# Patient Record
Sex: FEMALE | Race: WHITE | NOT HISPANIC OR LATINO | Employment: FULL TIME | URBAN - METROPOLITAN AREA
[De-identification: names, ages, dates, MRNs, and addresses within clinical notes are randomized per-mention and may not be internally consistent; named-entity substitution may affect disease eponyms.]

---

## 2023-03-22 LAB
ERYTHROCYTE DISTRIBUTION WIDTH (RATIO) BY AUTOMATED COUNT: 13.2 % (ref 11.5–14.5)
ERYTHROCYTE MEAN CORPUSCULAR HEMOGLOBIN CONCENTRATION (G/DL) BY AUTOMATED: 32.1 G/DL (ref 32–36)
ERYTHROCYTE MEAN CORPUSCULAR VOLUME (FL) BY AUTOMATED COUNT: 96 FL (ref 80–100)
ERYTHROCYTES (10*6/UL) IN BLOOD BY AUTOMATED COUNT: 3.78 X10E12/L (ref 4–5.2)
GLUCOSE, 1 HR SCREEN, PREG: 116 MG/DL
HEMATOCRIT (%) IN BLOOD BY AUTOMATED COUNT: 36.1 % (ref 36–46)
HEMOGLOBIN (G/DL) IN BLOOD: 11.6 G/DL (ref 12–16)
LEUKOCYTES (10*3/UL) IN BLOOD BY AUTOMATED COUNT: 12.8 X10E9/L (ref 4.4–11.3)
PLATELETS (10*3/UL) IN BLOOD AUTOMATED COUNT: 343 X10E9/L (ref 150–450)
REFLEX ADDED, ANEMIA PANEL: ABNORMAL

## 2023-05-11 LAB
CLUE CELLS: NORMAL
NUGENT SCORE: 1
URINE CULTURE: NORMAL
YEAST: NORMAL

## 2023-06-09 LAB — GROUP B STREP SCREEN: ABNORMAL

## 2023-07-16 LAB — URINE CULTURE: NO GROWTH

## 2023-09-20 LAB
ALANINE AMINOTRANSFERASE (SGPT) (U/L) IN SER/PLAS: 44 U/L (ref 7–45)
ALBUMIN (G/DL) IN SER/PLAS: 5 G/DL (ref 3.4–5)
ALKALINE PHOSPHATASE (U/L) IN SER/PLAS: 104 U/L (ref 33–110)
ANION GAP IN SER/PLAS: 16 MMOL/L (ref 10–20)
ASPARTATE AMINOTRANSFERASE (SGOT) (U/L) IN SER/PLAS: 28 U/L (ref 9–39)
BILIRUBIN TOTAL (MG/DL) IN SER/PLAS: 1 MG/DL (ref 0–1.2)
C REACTIVE PROTEIN (MG/L) IN SER/PLAS: 0.16 MG/DL
CALCIUM (MG/DL) IN SER/PLAS: 10.3 MG/DL (ref 8.6–10.3)
CARBON DIOXIDE, TOTAL (MMOL/L) IN SER/PLAS: 28 MMOL/L (ref 21–32)
CHLORIDE (MMOL/L) IN SER/PLAS: 101 MMOL/L (ref 98–107)
COBALAMIN (VITAMIN B12) (PG/ML) IN SER/PLAS: 512 PG/ML (ref 211–911)
CREATININE (MG/DL) IN SER/PLAS: 0.73 MG/DL (ref 0.5–1.05)
ERYTHROCYTE DISTRIBUTION WIDTH (RATIO) BY AUTOMATED COUNT: 12 % (ref 11.5–14.5)
ERYTHROCYTE MEAN CORPUSCULAR HEMOGLOBIN CONCENTRATION (G/DL) BY AUTOMATED: 33.2 G/DL (ref 32–36)
ERYTHROCYTE MEAN CORPUSCULAR VOLUME (FL) BY AUTOMATED COUNT: 89 FL (ref 80–100)
ERYTHROCYTES (10*6/UL) IN BLOOD BY AUTOMATED COUNT: 4.42 X10E12/L (ref 4–5.2)
FOLATE (NG/ML) IN SER/PLAS: >22.3 NG/ML
GFR FEMALE: >90 ML/MIN/1.73M2
GLUCOSE (MG/DL) IN SER/PLAS: 73 MG/DL (ref 74–99)
HEMATOCRIT (%) IN BLOOD BY AUTOMATED COUNT: 39.5 % (ref 36–46)
HEMOGLOBIN (G/DL) IN BLOOD: 13.1 G/DL (ref 12–16)
LEUKOCYTES (10*3/UL) IN BLOOD BY AUTOMATED COUNT: 5.7 X10E9/L (ref 4.4–11.3)
PLATELETS (10*3/UL) IN BLOOD AUTOMATED COUNT: 430 X10E9/L (ref 150–450)
POTASSIUM (MMOL/L) IN SER/PLAS: 3.8 MMOL/L (ref 3.5–5.3)
PROTEIN TOTAL: 8.1 G/DL (ref 6.4–8.2)
SEDIMENTATION RATE, ERYTHROCYTE: 12 MM/H (ref 0–20)
SODIUM (MMOL/L) IN SER/PLAS: 141 MMOL/L (ref 136–145)
THYROTROPIN (MIU/L) IN SER/PLAS BY DETECTION LIMIT <= 0.05 MIU/L: 0.99 MIU/L (ref 0.44–3.98)
TISSUE TRANSGLUTAMINASE, IGA: <1 U/ML (ref 0–14)
UREA NITROGEN (MG/DL) IN SER/PLAS: 12 MG/DL (ref 6–23)

## 2023-10-25 ENCOUNTER — ANESTHESIA EVENT (OUTPATIENT)
Dept: GASTROENTEROLOGY | Facility: EXTERNAL LOCATION | Age: 28
End: 2023-10-25

## 2023-11-01 PROBLEM — R19.4 CHANGE IN BOWEL HABITS: Status: ACTIVE | Noted: 2023-11-01

## 2023-11-01 PROBLEM — K92.1 BLOOD IN STOOL: Status: ACTIVE | Noted: 2023-11-01

## 2023-11-01 PROBLEM — O13.9 GESTATIONAL HYPERTENSION (HHS-HCC): Status: ACTIVE | Noted: 2023-11-01

## 2023-11-01 PROBLEM — H10.211 CHEMICAL CONJUNCTIVITIS OF RIGHT EYE: Status: ACTIVE | Noted: 2023-11-01

## 2023-11-01 PROBLEM — N36.8 IRRITATION OF URETHRA: Status: ACTIVE | Noted: 2023-11-01

## 2023-11-01 PROBLEM — R16.0 LIVER MASS, RIGHT LOBE: Status: ACTIVE | Noted: 2023-11-01

## 2023-11-01 PROBLEM — K76.0 FATTY LIVER: Status: ACTIVE | Noted: 2023-11-01

## 2023-11-01 RX ORDER — ACETAMINOPHEN 325 MG/1
325 TABLET ORAL EVERY 6 HOURS
COMMUNITY
Start: 2023-07-04

## 2023-11-01 RX ORDER — DOCUSATE SODIUM 100 MG/1
CAPSULE, LIQUID FILLED ORAL
COMMUNITY
End: 2023-11-02 | Stop reason: ALTCHOICE

## 2023-11-01 RX ORDER — NORGESTIMATE AND ETHINYL ESTRADIOL 7DAYSX3 LO
1 KIT ORAL DAILY
COMMUNITY
Start: 2020-07-15

## 2023-11-01 RX ORDER — IBUPROFEN 600 MG/1
600 TABLET ORAL EVERY 6 HOURS
COMMUNITY
Start: 2023-07-04

## 2023-11-02 ENCOUNTER — HOSPITAL ENCOUNTER (OUTPATIENT)
Dept: GASTROENTEROLOGY | Facility: EXTERNAL LOCATION | Age: 28
Discharge: HOME | End: 2023-11-02
Payer: COMMERCIAL

## 2023-11-02 ENCOUNTER — ANESTHESIA (OUTPATIENT)
Dept: GASTROENTEROLOGY | Facility: EXTERNAL LOCATION | Age: 28
End: 2023-11-02

## 2023-11-02 VITALS
OXYGEN SATURATION: 100 % | RESPIRATION RATE: 17 BRPM | SYSTOLIC BLOOD PRESSURE: 116 MMHG | HEART RATE: 72 BPM | TEMPERATURE: 97.9 F | WEIGHT: 165 LBS | HEIGHT: 69 IN | BODY MASS INDEX: 24.44 KG/M2 | DIASTOLIC BLOOD PRESSURE: 88 MMHG

## 2023-11-02 DIAGNOSIS — R19.4 CHANGE IN BOWEL HABIT: ICD-10-CM

## 2023-11-02 DIAGNOSIS — K92.1 MELENA: Primary | ICD-10-CM

## 2023-11-02 LAB — PREGNANCY TEST URINE, POC: NEGATIVE

## 2023-11-02 PROCEDURE — 45378 DIAGNOSTIC COLONOSCOPY: CPT | Performed by: INTERNAL MEDICINE

## 2023-11-02 PROCEDURE — 81025 URINE PREGNANCY TEST: CPT | Performed by: INTERNAL MEDICINE

## 2023-11-02 RX ORDER — SODIUM CHLORIDE 9 MG/ML
20 INJECTION, SOLUTION INTRAVENOUS CONTINUOUS
Status: DISCONTINUED | OUTPATIENT
Start: 2023-11-02 | End: 2023-11-03 | Stop reason: HOSPADM

## 2023-11-02 RX ORDER — PROPOFOL 10 MG/ML
INJECTION, EMULSION INTRAVENOUS AS NEEDED
Status: DISCONTINUED | OUTPATIENT
Start: 2023-11-02 | End: 2023-11-02

## 2023-11-02 RX ADMIN — SODIUM CHLORIDE: 9 INJECTION, SOLUTION INTRAVENOUS at 08:05

## 2023-11-02 RX ADMIN — PROPOFOL 20 MG: 10 INJECTION, EMULSION INTRAVENOUS at 08:16

## 2023-11-02 RX ADMIN — PROPOFOL 20 MG: 10 INJECTION, EMULSION INTRAVENOUS at 08:13

## 2023-11-02 RX ADMIN — PROPOFOL 120 MG: 10 INJECTION, EMULSION INTRAVENOUS at 08:08

## 2023-11-02 RX ADMIN — PROPOFOL 80 MG: 10 INJECTION, EMULSION INTRAVENOUS at 08:12

## 2023-11-02 SDOH — HEALTH STABILITY: MENTAL HEALTH: CURRENT SMOKER: 0

## 2023-11-02 ASSESSMENT — COLUMBIA-SUICIDE SEVERITY RATING SCALE - C-SSRS
6. HAVE YOU EVER DONE ANYTHING, STARTED TO DO ANYTHING, OR PREPARED TO DO ANYTHING TO END YOUR LIFE?: NO
1. IN THE PAST MONTH, HAVE YOU WISHED YOU WERE DEAD OR WISHED YOU COULD GO TO SLEEP AND NOT WAKE UP?: NO
2. HAVE YOU ACTUALLY HAD ANY THOUGHTS OF KILLING YOURSELF?: NO

## 2023-11-02 ASSESSMENT — PAIN SCALES - GENERAL
PAINLEVEL_OUTOF10: 0 - NO PAIN
PAINLEVEL_OUTOF10: 0 - NO PAIN
PAIN_LEVEL: 0
PAINLEVEL_OUTOF10: 0 - NO PAIN
PAINLEVEL_OUTOF10: 0 - NO PAIN

## 2023-11-02 ASSESSMENT — PAIN - FUNCTIONAL ASSESSMENT
PAIN_FUNCTIONAL_ASSESSMENT: 0-10

## 2023-11-02 NOTE — PRE-SEDATION DOCUMENTATION
Patient: Ann Moore  MRN: [id number]    Pre-sedation Evaluation:  Sedation necessary for: Analgesia  Requesting service: gi    History of Present Illness: diagnostic colonoscopy. Rectal bleed     Past Medical History:   Diagnosis Date    Fatty (change of) liver, not elsewhere classified 04/05/2021    Fatty liver    Other conditions influencing health status     No significant past surgical history    Other conditions influencing health status     No significant past medical history       Principle problems:  Patient Active Problem List    Diagnosis Date Noted    Blood in stool 11/01/2023    Change in bowel habits 11/01/2023    Chemical conjunctivitis of right eye 11/01/2023    Fatty liver 11/01/2023    Gestational hypertension 11/01/2023    Irritation of urethra 11/01/2023    Liver mass, right lobe 11/01/2023    Normal vaginal delivery 11/01/2023    Third degree perineal laceration, delivered, current hospitalization 11/01/2023     Allergies:  No Known Allergies  PTA/Current Medications:  (Not in a hospital admission)    Current Outpatient Medications   Medication Sig Dispense Refill    acetaminophen (Tylenol) 325 mg tablet Take 1 tablet (325 mg) by mouth every 6 hours.      docusate sodium (Colace) 100 mg capsule Take by mouth.      ibuprofen 600 mg tablet Take 1 tablet (600 mg) by mouth every 6 hours.      norgestimate-ethinyl estradioL (Ortho Tri-Cyclen LO) 0.18/0.215/0.25 mg-25 mcg tablet Take 1 tablet by mouth once daily.      prenatal 21/iron fu/folic acid (PRENATAL COMPLETE ORAL) Take by mouth once daily.       No current facility-administered medications for this encounter.     Past Surgical History:   has a past surgical history that includes Other surgical history (08/17/2020).    Recent sedation/surgery (24 hours) No    Review of Systems:  Please check all that apply: No significant medical history    Pregnancy test completed prior to procedure on any menstruating female: negative        NPO  guidelines met: Yes    Physical Exam    Airway  Mallampati: I  TM distance: <3 FB  Neck ROM: full     Cardiovascular - normal exam     Dental - normal exam     Pulmonary - normal exam         Plan    ASA 1     Deep

## 2023-11-02 NOTE — ANESTHESIA POSTPROCEDURE EVALUATION
Patient: Ann Moore    Procedure Summary       Date: 11/02/23 Room / Location: [facility]    Anesthesia Start: 0805 Anesthesia Stop: 0825    Procedure: COLONOSCOPY Diagnosis:       Melena      Change in bowel habit      Melena    Scheduled Providers: Kain Leos MD Responsible Provider: SHAGGY Coffey    Anesthesia Type: MAC ASA Status: 1            Anesthesia Type: MAC    Vitals Value Taken Time   /79 11/02/23 0824   Temp 36.6 °C (97.9 °F) 11/02/23 0824   Pulse 76 11/02/23 0824   Resp 19 11/02/23 0824   SpO2 99 % 11/02/23 0824       Anesthesia Post Evaluation    Patient location during evaluation: PACU  Patient participation: complete - patient participated  Level of consciousness: awake and alert  Pain score: 0  Pain management: satisfactory to patient  Airway patency: patent  Cardiovascular status: acceptable  Respiratory status: acceptable  Hydration status: acceptable        There were no known notable events for this encounter.

## 2023-11-02 NOTE — ANESTHESIA PREPROCEDURE EVALUATION
Patient: Ann Moore    Procedure Information       Date/Time: 11/02/23 0800    Scheduled providers: Kain Leos MD    Procedure: COLONOSCOPY    Location: [facility]            Relevant Problems   Anesthesia (within normal limits)      Cardiovascular   (+) Gestational hypertension      /Renal   (+) Fatty liver      GI/Hepatic   (+) Fatty liver       Clinical information reviewed:   Tobacco  Allergies  Meds  Problems  Med Hx  Surg Hx  OB Status    Fam Hx  Soc Hx        NPO Detail:  NPO/Void Status  Date of Last Liquid: 11/01/23  Time of Last Liquid: 2330  Date of Last Solid: 11/01/23  Time of Last Solid: 0600  Last Intake Type: Clear fluids; GI prep         Physical Exam    Airway  Mallampati: II  TM distance: >3 FB  Neck ROM: full     Cardiovascular - normal exam     Dental    Pulmonary - normal exam     Abdominal - normal exam             Anesthesia Plan    ASA 1     MAC     The patient is not a current smoker.  Patient was not previously instructed to abstain from smoking on day of procedure.  Patient did not smoke on day of procedure.    intravenous induction   Anesthetic plan and risks discussed with patient.  Use of blood products discussed with patient who consented to blood products.    Plan discussed with CRNA.

## 2023-11-02 NOTE — PRE-SEDATION DOCUMENTATION
Patient: Ann Moore  MRN: [id number]    Pre-sedation Evaluation:  Sedation necessary for: Analgesia  Requesting service: gi    History of Present Illness: diagnostic colonoscopy.rectal bleed     Past Medical History:   Diagnosis Date    Fatty (change of) liver, not elsewhere classified 04/05/2021    Fatty liver    Other conditions influencing health status     No significant past surgical history    Other conditions influencing health status     No significant past medical history    Rectal bleeding        Principle problems:  Patient Active Problem List    Diagnosis Date Noted    Blood in stool 11/01/2023    Change in bowel habits 11/01/2023    Chemical conjunctivitis of right eye 11/01/2023    Fatty liver 11/01/2023    Gestational hypertension 11/01/2023    Irritation of urethra 11/01/2023    Liver mass, right lobe 11/01/2023    Normal vaginal delivery 11/01/2023    Third degree perineal laceration, delivered, current hospitalization 11/01/2023     Allergies:  No Known Allergies  PTA/Current Medications:  (Not in a hospital admission)    Current Outpatient Medications   Medication Sig Dispense Refill    prenatal 21/iron fu/folic acid (PRENATAL COMPLETE ORAL) Take by mouth once daily.      acetaminophen (Tylenol) 325 mg tablet Take 1 tablet (325 mg) by mouth every 6 hours.      ibuprofen 600 mg tablet Take 1 tablet (600 mg) by mouth every 6 hours.      norgestimate-ethinyl estradioL (Ortho Tri-Cyclen LO) 0.18/0.215/0.25 mg-25 mcg tablet Take 1 tablet by mouth once daily.       Current Facility-Administered Medications   Medication Dose Route Frequency Provider Last Rate Last Admin    sodium chloride 0.9% infusion  20 mL/hr intravenous Continuous Kain Leos MD         Past Surgical History:   has a past surgical history that includes Other surgical history (08/17/2020).    Recent sedation/surgery (24 hours) No    Review of Systems:  Please check all that apply: No significant medical history    Pregnancy  test completed prior to procedure on any menstruating female: negative        NPO guidelines met: Yes    Physical Exam    Airway  Mallampati: I  TM distance: <3 FB  Neck ROM: full     Cardiovascular - normal exam     Dental - normal exam     Pulmonary - normal exam         Plan    ASA 1     Deep

## 2023-11-02 NOTE — DISCHARGE INSTRUCTIONS
Patient Instructions Post Procedure      The anesthetics, sedatives or narcotics which were given to you today will be acting in your body for the next 24 hours, so you might feel a little sleepy or groggy.  This feeling should slowly wear off. Carefully read and follow the instructions.     You received sedation today:  - Do not drive or operate any machinery or power tools of any kind.   - No alcoholic beverages today, not even beer or wine.  - Do not make any important decisions or sign any legal documents.  - No over the counter medications that contain alcohol or that may cause drowsiness.    While it is common to experience mild to moderate abdominal distention, gas, or belching after your procedure, if any of these symptoms occur following discharge from the GI Lab or within one week of having your procedure, call the [facility] to be advised whether a visit to your nearest Urgent Care or Emergency Department is indicated.  Take this paper with you if you go.   - If you develop an allergic reaction to the medications that were given during your procedure such as difficulty breathing, rash, hives, severe nausea, vomiting or lightheadedness.  - If you experience chest pain, shortness of breath, severe abdominal pain, fevers and chills.  -If you develop signs and symptoms of bleeding such as blood in your spit, if your stools turn black, tarry, or bloody  - If you have not urinated within 8 hours following your procedure.  - If your IV site becomes painful, red, inflamed, or looks infected.    If you received a biopsy/polypectomy/sphincterotomy the following instructions apply below:  __ Do not use Aspirin containing products, non-steroidal medications or anti-coagulants for one week following your procedure. (Examples of these types of medications are: Advil, Arthrotec, Aleve, Coumadin, Ecotrin, Heparin, Ibuprofen, Indocin, Motrin, Naprosyn, Nuprin, Plavix, Vioxx, and Voltarin, or their generic  forms.  This list is not all-inclusive.  Check with your physician or pharmacist before resuming medications.)   __ Eat a soft diet today.  Avoid foods that are poorly digested for the next 24 hours.  These foods would include: nuts, beans, lettuce, red meats, and fried foods. Start with liquids and advance your diet as tolerated, gradually work up to eating solids.   __ Do not have a Barium Study or Enema for one week.    Your physician recommends the additional following instructions:    -You have a contact number available for emergencies. The signs and symptoms of potential delayed complications were discussed with you. You may return to normal activities tomorrow.  -Resume your previous diet or other if specified.  -Continue your present medications.   -We are waiting for your pathology results, if applicable.  -The findings and recommendations have been discussed with you and/or family.  - Please see Medication Reconciliation Form for new medication/medications prescribed.     If you experience any problems or have any questions following discharge from the GI Lab, please call: [phone] from 7 am- 4:30 pm.  In the event of an emergency please go to the closest Emergency Department or call Dr. Leos at [phone]

## 2023-11-20 ENCOUNTER — APPOINTMENT (OUTPATIENT)
Dept: GASTROENTEROLOGY | Facility: CLINIC | Age: 28
End: 2023-11-20

## 2025-02-03 ENCOUNTER — APPOINTMENT (OUTPATIENT)
Dept: OBSTETRICS AND GYNECOLOGY | Facility: CLINIC | Age: 30
End: 2025-02-03
Payer: COMMERCIAL

## 2025-03-05 ENCOUNTER — APPOINTMENT (OUTPATIENT)
Dept: OBSTETRICS AND GYNECOLOGY | Facility: CLINIC | Age: 30
End: 2025-03-05
Payer: COMMERCIAL

## 2025-03-05 VITALS
DIASTOLIC BLOOD PRESSURE: 112 MMHG | HEIGHT: 69 IN | WEIGHT: 182 LBS | HEART RATE: 124 BPM | SYSTOLIC BLOOD PRESSURE: 142 MMHG | OXYGEN SATURATION: 99 % | BODY MASS INDEX: 26.96 KG/M2

## 2025-03-05 DIAGNOSIS — Z01.419 WELL WOMAN EXAM WITH ROUTINE GYNECOLOGICAL EXAM: Primary | ICD-10-CM

## 2025-03-05 DIAGNOSIS — Z32.01 POSITIVE PREGNANCY TEST (HHS-HCC): ICD-10-CM

## 2025-03-05 LAB — PREGNANCY TEST URINE, POC: POSITIVE

## 2025-03-05 PROCEDURE — 3008F BODY MASS INDEX DOCD: CPT | Performed by: OBSTETRICS & GYNECOLOGY

## 2025-03-05 PROCEDURE — 3077F SYST BP >= 140 MM HG: CPT | Performed by: OBSTETRICS & GYNECOLOGY

## 2025-03-05 PROCEDURE — 3080F DIAST BP >= 90 MM HG: CPT | Performed by: OBSTETRICS & GYNECOLOGY

## 2025-03-05 PROCEDURE — 99395 PREV VISIT EST AGE 18-39: CPT | Performed by: OBSTETRICS & GYNECOLOGY

## 2025-03-05 PROCEDURE — 1036F TOBACCO NON-USER: CPT | Performed by: OBSTETRICS & GYNECOLOGY

## 2025-03-05 PROCEDURE — 81025 URINE PREGNANCY TEST: CPT | Performed by: OBSTETRICS & GYNECOLOGY

## 2025-03-05 ASSESSMENT — PATIENT HEALTH QUESTIONNAIRE - PHQ9
SUM OF ALL RESPONSES TO PHQ9 QUESTIONS 1 & 2: 0
2. FEELING DOWN, DEPRESSED OR HOPELESS: NOT AT ALL
1. LITTLE INTEREST OR PLEASURE IN DOING THINGS: NOT AT ALL

## 2025-03-05 NOTE — PROGRESS NOTES
"Patient presents for an annual exam  Last PAP 2023 ASCUS HPV-  Last Mammogram N/A   LMP 2025  Sexually active  Took +UPT this morning    LUIS Acevedo    ANNUAL SUBJECTIVE    Ann Moore is a 29 y.o. female who presents for annual exam today.  Her periods are regular, had a positive UPT today at home this AM.  Taking a prenatal vitamin. Last delivery at term c/b sPEC s/p mag.    PMH - none    PSH - none    OB history -   No obstetric history on file.    Last pap -   ASCUS HPV Negative     Last mammogram - n/a    Family history of breast or ovarian cancer - no    OBJECTIVE  BP (!) 142/112 (BP Location: Right arm, Patient Position: Sitting, BP Cuff Size: Adult)   Pulse (!) 124   Ht 1.753 m (5' 9\")   Wt 82.6 kg (182 lb)   LMP 2025   SpO2 99%   BMI 26.88 kg/m²     General Appearance   - consistent with stated age, well groomed and cooperative    Integumentary  - skin warm and dry without rash    Head and Neck  - normalocephalic and neck supple    Chest and Lung Exam  - normal breathing effort, no respiratory distress    Breast  - symmetry noted, no mass palpable, no skin change and no nipple discharge.    Abdomen  - soft, nontender and no hepatomegaly, splenomegaly, or mass    Female Genitourinary  - vulva normal without rash or lesion, normal vaginal rugae, no vaginal discharge, uterus normal size & no palpable masses, no adnexal mass, no adnexal tenderness, no cervical motion tenderness    Peripheral Vascular  - no edema present    ASSESSMENT/PLAN  29 y.o. yo  female who presents for annual exam.       Actions performed during this visit include:  - Clinical breast exam normal  - Clinical pelvic exam normal  - Pap: up to date, due next year  - Mammogram not indicated  - +UPT, will make new OB appt for 3-4 wks from now.    Ashley Walters MD      "

## 2025-03-23 ENCOUNTER — TELEPHONE (OUTPATIENT)
Dept: OBSTETRICS AND GYNECOLOGY | Facility: HOSPITAL | Age: 30
End: 2025-03-23
Payer: COMMERCIAL

## 2025-03-23 NOTE — TELEPHONE ENCOUNTER
Ann Moore is a 29 y.o. year old female  at approx 6w GA calling into the answering service with vaginal bleeding. Started with dark brown discharge yesterday, now with bleeding. Has been wearing a panty liner, not soaking through. She is having some cramping period-like pain. No other abdominal pain, nausea, vomiting, dizziness, fevers, chills.     Discussed possible etiologies of bleeding in early pregnancy including possible impending miscarriage. Reviewed bleeding precautions and when to seek emergency care. Encouraged patient to contact Dr. Walters's office tomorrow to schedule follow up. All questions answered and reinforced availability of answering service.

## 2025-03-24 ENCOUNTER — TELEPHONE (OUTPATIENT)
Dept: OBSTETRICS AND GYNECOLOGY | Facility: CLINIC | Age: 30
End: 2025-03-24
Payer: COMMERCIAL

## 2025-03-24 DIAGNOSIS — O20.9 HEMORRHAGE IN EARLY PREGNANCY: Primary | ICD-10-CM

## 2025-03-24 NOTE — TELEPHONE ENCOUNTER
I called and spoke with the patient. Patient advised to take a UPT. Patient will take one and call me with result.

## 2025-03-24 NOTE — TELEPHONE ENCOUNTER
Patient called OB line and spoke with me. Patient stated she is 5w 6d pregnant. LMP 2/11/2025. Has new OB appt with Dr. Walters on 4/09. Saturday night had brown discharge that turned into period bleeding. No clots. Mild period cramping. Patient is still bleeding and hasn't stopped since Saturday night. This is patient's second pregnancy. No history of miscarriage. Patient didn't know what to do from here.   Message sent to Dr. Walters for advise/guidance.   I will reach back out to patient after hearing from Dr. Walters.

## 2025-03-25 LAB — B-HCG SERPL-ACNC: 586 MIU/ML

## 2025-03-26 ENCOUNTER — APPOINTMENT (OUTPATIENT)
Dept: PRIMARY CARE | Facility: CLINIC | Age: 30
End: 2025-03-26
Payer: COMMERCIAL

## 2025-03-26 DIAGNOSIS — O20.9 HEMORRHAGE IN EARLY PREGNANCY: ICD-10-CM

## 2025-03-28 LAB — B-HCG SERPL-ACNC: 135 MIU/ML

## 2025-03-31 DIAGNOSIS — O20.9 HEMORRHAGE IN EARLY PREGNANCY: ICD-10-CM

## 2025-04-02 LAB — B-HCG SERPL-ACNC: 11 MIU/ML

## 2025-04-09 ENCOUNTER — APPOINTMENT (OUTPATIENT)
Dept: OBSTETRICS AND GYNECOLOGY | Facility: CLINIC | Age: 30
End: 2025-04-09
Payer: COMMERCIAL

## 2025-04-09 VITALS
HEART RATE: 97 BPM | HEIGHT: 69 IN | WEIGHT: 179 LBS | DIASTOLIC BLOOD PRESSURE: 84 MMHG | BODY MASS INDEX: 26.51 KG/M2 | SYSTOLIC BLOOD PRESSURE: 129 MMHG | OXYGEN SATURATION: 98 %

## 2025-04-09 DIAGNOSIS — O02.1 MISSED ABORTION (HHS-HCC): ICD-10-CM

## 2025-04-09 LAB — PREGNANCY TEST URINE, POC: NEGATIVE

## 2025-04-09 PROCEDURE — 81025 URINE PREGNANCY TEST: CPT | Performed by: OBSTETRICS & GYNECOLOGY

## 2025-04-09 PROCEDURE — 3074F SYST BP LT 130 MM HG: CPT | Performed by: OBSTETRICS & GYNECOLOGY

## 2025-04-09 PROCEDURE — 3079F DIAST BP 80-89 MM HG: CPT | Performed by: OBSTETRICS & GYNECOLOGY

## 2025-04-09 PROCEDURE — 3008F BODY MASS INDEX DOCD: CPT | Performed by: OBSTETRICS & GYNECOLOGY

## 2025-04-09 PROCEDURE — 99212 OFFICE O/P EST SF 10 MIN: CPT | Performed by: OBSTETRICS & GYNECOLOGY

## 2025-04-09 ASSESSMENT — ENCOUNTER SYMPTOMS
RESPIRATORY NEGATIVE: 1
EYES NEGATIVE: 1
CARDIOVASCULAR NEGATIVE: 1
ENDOCRINE NEGATIVE: 1
GASTROINTESTINAL NEGATIVE: 1
CONSTITUTIONAL NEGATIVE: 1

## 2025-04-09 NOTE — PROGRESS NOTES
Subjective   Patient ID: Ann Moore is a 29 y.o. female who presents for Follow-up.    HPI  30 y/o  presenting for follow up of completed ab.  Had a +UPT, then started bleeding on 3/23 at 5w6d, bhcg trended down to 11 and her UPT here today is negative.  No longer having any bleeding. Rh+.     Review of Systems   Constitutional: Negative.    HENT: Negative.     Eyes: Negative.    Respiratory: Negative.     Cardiovascular: Negative.    Gastrointestinal: Negative.    Endocrine: Negative.    Genitourinary: Negative.        Objective   Physical Exam  Gen in NAD    Assessment/Plan   30 y/o  presenting for follow up of completed Ab.  No further bleeding and UPT is neg. Discussed that almost all first trimester losses are chromosomal in nature.  Discussed a period usually in around 4-6 wks after she started bleeding. Okay to try again now.  If does not get a period, take another UPT.  Plan US at 7 wks in next pregnancy, offered serial hcg with next positive UPT if that will help, she will think about.  All questions answered.    Ashley Walters MD 25 11:19 AM

## 2025-06-23 ENCOUNTER — APPOINTMENT (OUTPATIENT)
Dept: OBSTETRICS AND GYNECOLOGY | Facility: CLINIC | Age: 30
End: 2025-06-23
Payer: COMMERCIAL

## 2025-06-23 VITALS — DIASTOLIC BLOOD PRESSURE: 90 MMHG | BODY MASS INDEX: 26.14 KG/M2 | WEIGHT: 177 LBS | SYSTOLIC BLOOD PRESSURE: 145 MMHG

## 2025-06-23 DIAGNOSIS — I10 CHRONIC HYPERTENSION: ICD-10-CM

## 2025-06-23 DIAGNOSIS — Z34.81 PRENATAL CARE, SUBSEQUENT PREGNANCY IN FIRST TRIMESTER: ICD-10-CM

## 2025-06-23 DIAGNOSIS — Z3A.09 9 WEEKS GESTATION OF PREGNANCY (HHS-HCC): Primary | ICD-10-CM

## 2025-06-23 PROBLEM — R19.4 CHANGE IN BOWEL HABITS: Status: RESOLVED | Noted: 2023-11-01 | Resolved: 2025-06-23

## 2025-06-23 PROBLEM — N36.8 IRRITATION OF URETHRA: Status: RESOLVED | Noted: 2023-11-01 | Resolved: 2025-06-23

## 2025-06-23 PROBLEM — R16.0 LIVER MASS, RIGHT LOBE: Status: RESOLVED | Noted: 2023-11-01 | Resolved: 2025-06-23

## 2025-06-23 PROBLEM — K92.1 BLOOD IN STOOL: Status: RESOLVED | Noted: 2023-11-01 | Resolved: 2025-06-23

## 2025-06-23 PROBLEM — K76.0 FATTY LIVER: Status: RESOLVED | Noted: 2023-11-01 | Resolved: 2025-06-23

## 2025-06-23 PROCEDURE — 0500F INITIAL PRENATAL CARE VISIT: CPT | Performed by: OBSTETRICS & GYNECOLOGY

## 2025-06-23 ASSESSMENT — EDINBURGH POSTNATAL DEPRESSION SCALE (EPDS)
I HAVE BEEN SO UNHAPPY THAT I HAVE HAD DIFFICULTY SLEEPING: NOT AT ALL
TOTAL SCORE: 4
THE THOUGHT OF HARMING MYSELF HAS OCCURRED TO ME: NEVER
THINGS HAVE BEEN GETTING ON TOP OF ME: NO, I HAVE BEEN COPING AS WELL AS EVER
I HAVE FELT SCARED OR PANICKY FOR NO GOOD REASON: NO, NOT MUCH
I HAVE BEEN ANXIOUS OR WORRIED FOR NO GOOD REASON: YES, SOMETIMES
I HAVE BEEN ABLE TO LAUGH AND SEE THE FUNNY SIDE OF THINGS: AS MUCH AS I ALWAYS COULD
I HAVE BLAMED MYSELF UNNECESSARILY WHEN THINGS WENT WRONG: NOT VERY OFTEN
I HAVE FELT SAD OR MISERABLE: NO, NOT AT ALL
I HAVE BEEN SO UNHAPPY THAT I HAVE BEEN CRYING: NO, NEVER
I HAVE LOOKED FORWARD WITH ENJOYMENT TO THINGS: AS MUCH AS I EVER DID

## 2025-06-23 NOTE — PROGRESS NOTES
Subjective   Patient ID [id number]   Ann Moore is a 30 y.o.  at 9w0d with a working estimated date of delivery of 2026, by Last Menstrual Period who presents for an initial prenatal visit.     Her pregnancy is complicated by:  -h/o  at term c/b sPEC s/p mag 2023  -early MAB earlier this year  -likely cHTN (not on meds)    OB History    Para Term  AB Living   3 1 1  1 1   SAB IAB Ectopic Multiple Live Births   1    1      # Outcome Date GA Lbr Al[place]ado/2nd Weight Sex Type Anes PTL Lv   3 Current            2 Term 23 40w2d  3.232 kg F Vag-Spont EPI  ROMELIA   1 SAB  5w6d            [facility]  Depression Scale Total: 4    Objective   Physical Exam  Weight: 80.3 kg (177 lb)  Expected Total Weight Gain: 7 kg (15 lb)-11.5 kg (25 lb)   Pregravid BMI: 26.13  BP: 145/90    OBGyn Exam  Gen in NAD  TVUS with single IUP with +FCA c/w LMP dating, JANEEN 26    Prenatal Labs  Will draw n/v    Assessment/Plan   31 y/o  at 9.0 wks by sure LMP c/w FTUS today presenting for new ob visit.  -continue prenatal vitamin  -starting BMI 26  -declines NIPS  -will schedule NT scan  -start  mg at 12 wks  -discussed her bps, likely has cHTN, discussed starting bp meds for bps >140/90, will see what bp is next visit to decide on meds or not; plan baseline HELLP labs with new ob labs  -h/o 3rd degree perineal laceration, discuss YULI at future visit  -well aware of practice model, [facility] etc  -RTC 4 wks    Ashley Walters MD

## 2025-06-25 LAB
BACTERIA UR CULT: NORMAL
C TRACH RRNA SPEC QL NAA+PROBE: NOT DETECTED
N GONORRHOEA RRNA SPEC QL NAA+PROBE: NOT DETECTED
QUEST GC CT AMPLIFIED (ALWAYS MESSAGE): NORMAL

## 2025-07-14 ENCOUNTER — TELEPHONE (OUTPATIENT)
Dept: OBSTETRICS AND GYNECOLOGY | Facility: CLINIC | Age: 30
End: 2025-07-14

## 2025-07-15 ENCOUNTER — HOSPITAL ENCOUNTER (EMERGENCY)
Facility: HOSPITAL | Age: 30
Discharge: HOME | End: 2025-07-15
Attending: STUDENT IN AN ORGANIZED HEALTH CARE EDUCATION/TRAINING PROGRAM
Payer: COMMERCIAL

## 2025-07-15 VITALS
BODY MASS INDEX: 26.22 KG/M2 | SYSTOLIC BLOOD PRESSURE: 137 MMHG | HEART RATE: 83 BPM | DIASTOLIC BLOOD PRESSURE: 83 MMHG | RESPIRATION RATE: 16 BRPM | HEIGHT: 69 IN | OXYGEN SATURATION: 100 % | TEMPERATURE: 97.5 F | WEIGHT: 177 LBS

## 2025-07-15 DIAGNOSIS — R51.9 NONINTRACTABLE HEADACHE, UNSPECIFIED CHRONICITY PATTERN, UNSPECIFIED HEADACHE TYPE: Primary | ICD-10-CM

## 2025-07-15 LAB
APPEARANCE UR: CLEAR
BILIRUB UR STRIP.AUTO-MCNC: NEGATIVE MG/DL
COLOR UR: COLORLESS
GLUCOSE UR STRIP.AUTO-MCNC: NORMAL MG/DL
KETONES UR STRIP.AUTO-MCNC: NEGATIVE MG/DL
LEUKOCYTE ESTERASE UR QL STRIP.AUTO: NEGATIVE
NITRITE UR QL STRIP.AUTO: NEGATIVE
PH UR STRIP.AUTO: 6.5 [PH]
PROT UR STRIP.AUTO-MCNC: NEGATIVE MG/DL
RBC # UR STRIP.AUTO: NEGATIVE MG/DL
SP GR UR STRIP.AUTO: 1
UROBILINOGEN UR STRIP.AUTO-MCNC: NORMAL MG/DL

## 2025-07-15 PROCEDURE — 99284 EMERGENCY DEPT VISIT MOD MDM: CPT

## 2025-07-15 PROCEDURE — 99283 EMERGENCY DEPT VISIT LOW MDM: CPT | Performed by: STUDENT IN AN ORGANIZED HEALTH CARE EDUCATION/TRAINING PROGRAM

## 2025-07-15 PROCEDURE — 81003 URINALYSIS AUTO W/O SCOPE: CPT | Performed by: STUDENT IN AN ORGANIZED HEALTH CARE EDUCATION/TRAINING PROGRAM

## 2025-07-15 PROCEDURE — 87086 URINE CULTURE/COLONY COUNT: CPT | Mod: STJLAB | Performed by: STUDENT IN AN ORGANIZED HEALTH CARE EDUCATION/TRAINING PROGRAM

## 2025-07-15 RX ORDER — ACETAMINOPHEN 325 MG/1
975 TABLET ORAL ONCE
Status: DISCONTINUED | OUTPATIENT
Start: 2025-07-15 | End: 2025-07-15 | Stop reason: HOSPADM

## 2025-07-15 ASSESSMENT — PAIN DESCRIPTION - DESCRIPTORS: DESCRIPTORS: ACHING

## 2025-07-15 ASSESSMENT — PAIN - FUNCTIONAL ASSESSMENT: PAIN_FUNCTIONAL_ASSESSMENT: 0-10

## 2025-07-15 ASSESSMENT — PAIN SCALES - GENERAL: PAINLEVEL_OUTOF10: 3

## 2025-07-15 ASSESSMENT — PAIN DESCRIPTION - LOCATION: LOCATION: HEAD

## 2025-07-15 NOTE — ED PROVIDER NOTES
History of Present Illness       History provided by: Patient  Limitations to History: None  External Records Reviewed with Brief Summary: Notes reviewed in patient's chart    HPI:  HPI     This is a 30-year-old female G3, P1 with previous miscarriage and history of preeclampsia during labor per patient presenting to the ED for headache.  Patient states for the past 5 days since Friday she has had a gradually worsening headache.  States originally it was come and go but since Sunday has increased in severity and is more constant.  Endorsing lightheadedness without syncope on Sunday.  Denies vision changes, nausea/vomiting, chest pain, shortness of breath, vaginal bleeding, abdominal pain.  Currently she states is a dull headache which is 3 in severity.  States she did message with her OB/GYN in which she was told to come to the ED for further evaluation of other causes.    Physical Exam   Physical Exam  Constitutional:       Appearance: She is well-developed.   HENT:      Head: Normocephalic.      Nose: Nose normal.      Mouth/Throat:      Mouth: Mucous membranes are moist.   Eyes:      Extraocular Movements: Extraocular movements intact.   Cardiovascular:      Rate and Rhythm: Normal rate and regular rhythm.   Pulmonary:      Effort: Pulmonary effort is normal.      Breath sounds: Normal breath sounds.   Abdominal:      Tenderness: There is no abdominal tenderness.   Musculoskeletal:         General: Normal range of motion.      Cervical back: Normal range of motion.   Skin:     General: Skin is warm.   Neurological:      General: No focal deficit present.      Mental Status: She is alert and oriented to person, place, and time.      Cranial Nerves: No dysarthria or facial asymmetry.      Sensory: Sensation is intact.      Motor: No weakness.      Coordination: Finger-Nose-Finger Test normal.   Psychiatric:         Mood and Affect: Mood normal.         Behavior: Behavior normal.         Thought Content: Thought  content normal.          Triage vitals:  T 36.4 °C (97.5 °F)  HR 98  /87  RR 17  O2 100 % None (Room air)    Medical Decision Making & ED Course     ED Course & MDM       Medical Decision Making:  Medical Decision Making  This is a 30-year-old female G3, P1 with previous miscarriage and history of preeclampsia during labor per patient presenting to the ED for headache.      Upon arrival to the ED patient is alert and oriented in no acute distress.  Blood pressure on arrival, 144/87. she is vitally stable and afebrile.  On exam she is neurally intact, face with symmetric, strength intact, sensation intact, no dysarthria, normal finger-nose.  Abdomen nontender to palpation.  Normal heart sounds and breath sounds bilateral.      Patient offered Tylenol which she declined stating that her headache is tolerable at this time.  Offered blood work for reassurance in which patient declined; at this time do not feel necessary as she does not have any systemic symptoms other than headache such as not limited to vaginal bleeding, cramping, nausea/vomiting, chest pain.  UA collected for possible asymptomatic bacteriuria as well as to obtain basic protein levels; low concern for UTI.  Discussed with patient that preeclampsia occurs in third trimester in which she is not currently in.  Low suspicion for blood clot or tumor due to patient being neurologically intact, states the headache is not worse while laying down; CT head not obtained.    Patient discharged in stable condition advised to stay well-hydrated obtain adequate rest.  Continue taking Tylenol as needed for headaches.  States she does have a follow-up appointment with OB/GYN next week.  Advised patient to continue with this appointment. Strict return precautions provided such as but not limited to severe worsening of headache, vision changes, speech changes, paralysis, headache worsened by laying down.  ----      Differential diagnoses considered include but  "are not limited to: Blood clot, tumor, tension headache, UTI       Visit Vitals  /83 (BP Location: Left arm, Patient Position: Sitting)   Pulse 83   Temp 36.4 °C (97.5 °F) (Temporal)   Resp 16   Ht 1.753 m (5' 9\")   Wt 80.3 kg (177 lb)   LMP 04/21/2025   SpO2 100%   BMI 26.14 kg/m²   OB Status Pregnant   Smoking Status Never   BSA 1.98 m²        Labs Reviewed   URINALYSIS WITH REFLEX MICROSCOPIC - Abnormal       Result Value    Color, Urine Colorless (*)     Appearance, Urine Clear      Specific Gravity, Urine 1.005      pH, Urine 6.5      Protein, Urine NEGATIVE      Glucose, Urine Normal      Blood, Urine NEGATIVE      Ketones, Urine NEGATIVE      Bilirubin, Urine NEGATIVE      Urobilinogen, Urine Normal      Nitrite, Urine NEGATIVE      Leukocyte Esterase, Urine NEGATIVE     URINE CULTURE   URINALYSIS WITH REFLEX CULTURE AND MICROSCOPIC    Narrative:     The following orders were created for panel order Urinalysis with Reflex Culture and Microscopic.  Procedure                               Abnormality         Status                     ---------                               -----------         ------                     Urine Culture[[id number]]                                    In process                 Urinalysis with Reflex M...[[id number]]  Abnormal            Final result                 Please view results for these tests on the individual orders.       No orders to display       ED Course:  Diagnoses as of 07/15/25 1935   Nonintractable headache, unspecified chronicity pattern, unspecified headache type     Disposition     As a result of the work-up, the patient was discharged home.  she was informed of her diagnosis and instructed to come back with any concerns or worsening of condition.  she was agreeable to the plan as discussed above.  she was given the opportunity to ask questions.  All of the patient's questions were answered.      Procedures   Procedures    This was a shared visit with an ED " attending.  The patient was seen and discussed with the ED attending    Sue Bloom PA-C  Emergency Medicine      Sue Bloom PA-C  07/15/25 1936       Sue Bloom PA-C  07/15/25 1936

## 2025-07-15 NOTE — DISCHARGE INSTRUCTIONS
You were seen here for a headache.  I recommend that you continue to take Tylenol as needed for pain management.  Stay well-hydrated and obtain adequate rest.  Please follow-up with your OB/GYN.    Return to the emergency department if he develop new or worsening of symptoms such as but not limited to severe worsening of headache, nausea/vomiting, paralysis, headache worsened while laying down, speech or visual changes.

## 2025-07-16 ENCOUNTER — APPOINTMENT (OUTPATIENT)
Dept: RADIOLOGY | Facility: HOSPITAL | Age: 30
End: 2025-07-16
Payer: COMMERCIAL

## 2025-07-16 ENCOUNTER — APPOINTMENT (OUTPATIENT)
Dept: CARDIOLOGY | Facility: HOSPITAL | Age: 30
End: 2025-07-16
Payer: COMMERCIAL

## 2025-07-16 ENCOUNTER — HOSPITAL ENCOUNTER (EMERGENCY)
Facility: HOSPITAL | Age: 30
Discharge: HOME | End: 2025-07-17
Attending: STUDENT IN AN ORGANIZED HEALTH CARE EDUCATION/TRAINING PROGRAM
Payer: COMMERCIAL

## 2025-07-16 DIAGNOSIS — R55 SYNCOPE AND COLLAPSE: Primary | ICD-10-CM

## 2025-07-16 DIAGNOSIS — R10.30 LOWER ABDOMINAL PAIN: ICD-10-CM

## 2025-07-16 LAB
ALBUMIN SERPL BCP-MCNC: 4.3 G/DL (ref 3.4–5)
ALP SERPL-CCNC: 56 U/L (ref 33–110)
ALT SERPL W P-5'-P-CCNC: 15 U/L (ref 7–45)
ANION GAP SERPL CALC-SCNC: 16 MMOL/L (ref 10–20)
AST SERPL W P-5'-P-CCNC: 16 U/L (ref 9–39)
B-HCG SERPL-ACNC: ABNORMAL MIU/ML
BACTERIA UR CULT: NORMAL
BASOPHILS # BLD AUTO: 0.03 X10*3/UL (ref 0–0.1)
BASOPHILS NFR BLD AUTO: 0.3 %
BILIRUB SERPL-MCNC: 1.4 MG/DL (ref 0–1.2)
BUN SERPL-MCNC: 10 MG/DL (ref 6–23)
CALCIUM SERPL-MCNC: 9.3 MG/DL (ref 8.6–10.3)
CARDIAC TROPONIN I PNL SERPL HS: <3 NG/L (ref 0–13)
CARDIAC TROPONIN I PNL SERPL HS: <3 NG/L (ref 0–13)
CHLORIDE SERPL-SCNC: 102 MMOL/L (ref 98–107)
CO2 SERPL-SCNC: 19 MMOL/L (ref 21–32)
CREAT SERPL-MCNC: 0.68 MG/DL (ref 0.5–1.05)
D DIMER PPP FEU-MCNC: 757 NG/ML FEU
EGFRCR SERPLBLD CKD-EPI 2021: >90 ML/MIN/1.73M*2
EOSINOPHIL # BLD AUTO: 0 X10*3/UL (ref 0–0.7)
EOSINOPHIL NFR BLD AUTO: 0 %
ERYTHROCYTE [DISTWIDTH] IN BLOOD BY AUTOMATED COUNT: 12.5 % (ref 11.5–14.5)
GLUCOSE SERPL-MCNC: 138 MG/DL (ref 74–99)
HCT VFR BLD AUTO: 36.9 % (ref 36–46)
HGB BLD-MCNC: 12.7 G/DL (ref 12–16)
IMM GRANULOCYTES # BLD AUTO: 0.03 X10*3/UL (ref 0–0.7)
IMM GRANULOCYTES NFR BLD AUTO: 0.3 % (ref 0–0.9)
LYMPHOCYTES # BLD AUTO: 0.62 X10*3/UL (ref 1.2–4.8)
LYMPHOCYTES NFR BLD AUTO: 5.8 %
MCH RBC QN AUTO: 30.2 PG (ref 26–34)
MCHC RBC AUTO-ENTMCNC: 34.4 G/DL (ref 32–36)
MCV RBC AUTO: 88 FL (ref 80–100)
MONOCYTES # BLD AUTO: 0.58 X10*3/UL (ref 0.1–1)
MONOCYTES NFR BLD AUTO: 5.4 %
NEUTROPHILS # BLD AUTO: 9.49 X10*3/UL (ref 1.2–7.7)
NEUTROPHILS NFR BLD AUTO: 88.2 %
NRBC BLD-RTO: 0 /100 WBCS (ref 0–0)
PLATELET # BLD AUTO: 299 X10*3/UL (ref 150–450)
POTASSIUM SERPL-SCNC: 3.5 MMOL/L (ref 3.5–5.3)
PROT SERPL-MCNC: 7 G/DL (ref 6.4–8.2)
RBC # BLD AUTO: 4.2 X10*6/UL (ref 4–5.2)
SODIUM SERPL-SCNC: 133 MMOL/L (ref 136–145)
WBC # BLD AUTO: 10.8 X10*3/UL (ref 4.4–11.3)

## 2025-07-16 PROCEDURE — 99285 EMERGENCY DEPT VISIT HI MDM: CPT | Performed by: STUDENT IN AN ORGANIZED HEALTH CARE EDUCATION/TRAINING PROGRAM

## 2025-07-16 PROCEDURE — 76815 OB US LIMITED FETUS(S): CPT | Performed by: STUDENT IN AN ORGANIZED HEALTH CARE EDUCATION/TRAINING PROGRAM

## 2025-07-16 PROCEDURE — 85379 FIBRIN DEGRADATION QUANT: CPT | Performed by: STUDENT IN AN ORGANIZED HEALTH CARE EDUCATION/TRAINING PROGRAM

## 2025-07-16 PROCEDURE — 76857 US EXAM PELVIC LIMITED: CPT | Performed by: STUDENT IN AN ORGANIZED HEALTH CARE EDUCATION/TRAINING PROGRAM

## 2025-07-16 PROCEDURE — 76815 OB US LIMITED FETUS(S): CPT | Mod: CCI | Performed by: STUDENT IN AN ORGANIZED HEALTH CARE EDUCATION/TRAINING PROGRAM

## 2025-07-16 PROCEDURE — 85025 COMPLETE CBC W/AUTO DIFF WBC: CPT | Performed by: STUDENT IN AN ORGANIZED HEALTH CARE EDUCATION/TRAINING PROGRAM

## 2025-07-16 PROCEDURE — 84484 ASSAY OF TROPONIN QUANT: CPT

## 2025-07-16 PROCEDURE — 36415 COLL VENOUS BLD VENIPUNCTURE: CPT

## 2025-07-16 PROCEDURE — 76801 OB US < 14 WKS SINGLE FETUS: CPT

## 2025-07-16 PROCEDURE — 80053 COMPREHEN METABOLIC PANEL: CPT | Performed by: STUDENT IN AN ORGANIZED HEALTH CARE EDUCATION/TRAINING PROGRAM

## 2025-07-16 PROCEDURE — 84702 CHORIONIC GONADOTROPIN TEST: CPT | Performed by: STUDENT IN AN ORGANIZED HEALTH CARE EDUCATION/TRAINING PROGRAM

## 2025-07-16 PROCEDURE — 36415 COLL VENOUS BLD VENIPUNCTURE: CPT | Performed by: STUDENT IN AN ORGANIZED HEALTH CARE EDUCATION/TRAINING PROGRAM

## 2025-07-16 PROCEDURE — 99285 EMERGENCY DEPT VISIT HI MDM: CPT | Mod: 25 | Performed by: STUDENT IN AN ORGANIZED HEALTH CARE EDUCATION/TRAINING PROGRAM

## 2025-07-16 PROCEDURE — 93005 ELECTROCARDIOGRAM TRACING: CPT

## 2025-07-16 PROCEDURE — 76705 ECHO EXAM OF ABDOMEN: CPT

## 2025-07-16 ASSESSMENT — PAIN DESCRIPTION - LOCATION: LOCATION: PELVIS

## 2025-07-16 ASSESSMENT — PAIN DESCRIPTION - PAIN TYPE: TYPE: ACUTE PAIN

## 2025-07-16 ASSESSMENT — PAIN - FUNCTIONAL ASSESSMENT: PAIN_FUNCTIONAL_ASSESSMENT: 0-10

## 2025-07-16 ASSESSMENT — PAIN SCALES - GENERAL: PAINLEVEL_OUTOF10: 5 - MODERATE PAIN

## 2025-07-17 VITALS
TEMPERATURE: 98.6 F | SYSTOLIC BLOOD PRESSURE: 129 MMHG | BODY MASS INDEX: 26.22 KG/M2 | DIASTOLIC BLOOD PRESSURE: 73 MMHG | OXYGEN SATURATION: 97 % | RESPIRATION RATE: 18 BRPM | WEIGHT: 177 LBS | HEART RATE: 98 BPM | HEIGHT: 69 IN

## 2025-07-17 LAB
APPEARANCE UR: CLEAR
ATRIAL RATE: 113 BPM
BILIRUB UR STRIP.AUTO-MCNC: NEGATIVE MG/DL
COLOR UR: COLORLESS
GLUCOSE UR STRIP.AUTO-MCNC: NORMAL MG/DL
HOLD SPECIMEN: NORMAL
KETONES UR STRIP.AUTO-MCNC: ABNORMAL MG/DL
LEUKOCYTE ESTERASE UR QL STRIP.AUTO: NEGATIVE
NITRITE UR QL STRIP.AUTO: NEGATIVE
P AXIS: 38 DEGREES
P OFFSET: 197 MS
P ONSET: 156 MS
PH UR STRIP.AUTO: 5.5 [PH]
PR INTERVAL: 134 MS
PROT UR STRIP.AUTO-MCNC: NEGATIVE MG/DL
Q ONSET: 223 MS
QRS COUNT: 19 BEATS
QRS DURATION: 80 MS
QT INTERVAL: 312 MS
QTC CALCULATION(BAZETT): 427 MS
QTC FREDERICIA: 385 MS
R AXIS: 85 DEGREES
RBC # UR STRIP.AUTO: ABNORMAL MG/DL
RBC #/AREA URNS AUTO: NORMAL /HPF
SP GR UR STRIP.AUTO: 1.01
T AXIS: 25 DEGREES
T OFFSET: 379 MS
UROBILINOGEN UR STRIP.AUTO-MCNC: NORMAL MG/DL
VENTRICULAR RATE: 113 BPM
WBC #/AREA URNS AUTO: NORMAL /HPF

## 2025-07-17 PROCEDURE — 81001 URINALYSIS AUTO W/SCOPE: CPT

## 2025-07-17 NOTE — ED PROCEDURE NOTE
Procedure    Performed by: Matt Roberts MD  Authorized by: Matt Roberts MD          Genitourinary Indications: evaluation for fetal cardiac activity            Procedure: Pelvic Ultrasound      Findings:  IUP: The pelvis was visualized and there was an INTRAUTERINE PREGNANCY visualized (a yolk sac, fetal pole or fetus was seen).  Fetal Heart Rate: 176 bpm  Pelvic Free Fluid: The pelvis was visualized and was NEGATIVE for free fluid.    Impression:  Pelvis: The focused pelvic ultrasound showed an INTRAUTERINE PREGNANCY.                   Matt Roberts MD  07/16/25 [id number]

## 2025-07-17 NOTE — ED PROVIDER NOTES
EMERGENCY DEPARTMENT ENCOUNTER      Pt Name: Ann Moore  MRN: [id number]  Birthdate 1995  Date of evaluation: 7/16/2025  Provider: Matt Roberts MD    CHIEF COMPLAINT       Chief Complaint   Patient presents with    Syncope     Has been having pelvic pain starting today, no bleeding, fever 101f, syncope x2 while showering today, fell from a sitting position.      HISTORY OF PRESENT ILLNESS    HPI  30-year-old female presents emergency department chief complaint of a syncopal episode.  She has a past medical history significant for preeclampsia and delivery of her first pregnancy.  Patient is approximately 12 weeks pregnant.  She appears to be accompanied by her significant other.    Patient claims that she had abdominal pain and constipation earlier in the day.  She claims that she was in the shower and she had a single episode she woke up on the floor.  She did attempt to get up at which point she syncopized again.  She is unclear if she hit her head she denies any current head pain.    Review of systems the patient denies any vaginal bleeding vomiting, diarrhea however she does complain that she has been very nauseous this whole time.  She denies any blurry vision or dizziness she was at the emergency department yesterday for evaluation of a headache.  She claims her headache is currently gone.      Nursing Notes were reviewed.    PAST MEDICAL HISTORY   Medical History[1]      SURGICAL HISTORY     Surgical History[2]      CURRENT MEDICATIONS       Previous Medications    PRENATAL 21/IRON FU/FOLIC ACID (PRENATAL COMPLETE ORAL)    Take by mouth once daily.       ALLERGIES     Patient has no known allergies.    FAMILY HISTORY     Family History[3]       SOCIAL HISTORY     Social History[4]    SCREENINGS                        PHYSICAL EXAM    (up to 7 for level 4, 8 or more for level 5)     ED Triage Vitals [07/16/25 2122]   Temperature Heart Rate Respirations BP   37.1 °C (98.8 °F) (!) 118 18 121/64       Pulse Ox Temp Source Heart Rate Source Patient Position   98 % Temporal -- --      BP Location FiO2 (%)     -- --       Physical Exam  Constitutional:       Appearance: Normal appearance.   HENT:      Head: Normocephalic and atraumatic.      Nose: Nose normal.      Mouth/Throat:      Mouth: Mucous membranes are moist.      Pharynx: Oropharynx is clear.     Eyes:      Extraocular Movements: Extraocular movements intact.      Conjunctiva/sclera: Conjunctivae normal.      Pupils: Pupils are equal, round, and reactive to light.       Cardiovascular:      Rate and Rhythm: Regular rhythm. Tachycardia present.      Pulses: Normal pulses.      Heart sounds: Normal heart sounds.   Pulmonary:      Effort: Pulmonary effort is normal.      Breath sounds: Normal breath sounds.   Abdominal:      General: Abdomen is flat.      Tenderness: There is abdominal tenderness in the right lower quadrant, suprapubic area and left lower quadrant.      Comments: Patient has lower suprapubic abdominal pain.     Musculoskeletal:         General: Normal range of motion.     Skin:     General: Skin is warm.      Capillary Refill: Capillary refill takes less than 2 seconds.     Neurological:      General: No focal deficit present.      Mental Status: She is alert.     Psychiatric:         Mood and Affect: Mood normal.          DIAGNOSTIC RESULTS     LABS:  Labs Reviewed   CBC WITH AUTO DIFFERENTIAL - Abnormal       Result Value    WBC 10.8      nRBC 0.0      RBC 4.20      Hemoglobin 12.7      Hematocrit 36.9      MCV 88      MCH 30.2      MCHC 34.4      RDW 12.5      Platelets 299      Neutrophils % 88.2      Immature Granulocytes %, Automated 0.3      Lymphocytes % 5.8      Monocytes % 5.4      Eosinophils % 0.0      Basophils % 0.3      Neutrophils Absolute 9.49 (*)     Immature Granulocytes Absolute, Automated 0.03      Lymphocytes Absolute 0.62 (*)     Monocytes Absolute 0.58      Eosinophils Absolute 0.00      Basophils Absolute 0.03      COMPREHENSIVE METABOLIC PANEL - Abnormal    Glucose 138 (*)     Sodium 133 (*)     Potassium 3.5      Chloride 102      Bicarbonate 19 (*)     Anion Gap 16      Urea Nitrogen 10      Creatinine 0.68      eGFR >90      Calcium 9.3      Albumin 4.3      Alkaline Phosphatase 56      Total Protein 7.0      AST 16      Bilirubin, Total 1.4 (*)     ALT 15     HUMAN CHORIONIC GONADOTROPIN, SERUM QUANTITATIVE - Abnormal    HCG, Beta-Quantitative 61,583 (*)     Narrative:      Total HCG measurement is performed using the [employer] Access   Immunoassay which detects intact HCG and free beta HCG subunit.    This test is not indicated for use as a tumor marker.   HCG testing is performed using a different test methodology at [facility] than other [facility]. Direct result comparison   should only be made within the same method.       D-DIMER, NON VTE - Abnormal    D-Dimer Non VTE, Quant (ng/mL FEU) 757 (*)     Narrative:     The D-Dimer assay is reported in ng/mL Fibrinogen Equivalent Units (FEU). The results of this assay should NOT be used for the exclusion of Deep Vein Thrombosis and/or Pulmonary Embolism.   SERIAL TROPONIN-INITIAL - Normal    Troponin I, High Sensitivity <3      Narrative:     Less than 99th percentile of normal range cutoff-  Female and children under 18 years old <14 ng/L; Male <21 ng/L: Negative  Repeat testing should be performed if clinically indicated.     Female and children under 18 years old 14-50 ng/L; Male 21-50 ng/L:  Consistent with possible cardiac damage and possible increased clinical   risk. Serial measurements may help to assess extent of myocardial damage.     >50 ng/L: Consistent with cardiac damage, increased clinical risk and  myocardial infarction. Serial measurements may help assess extent of   myocardial damage.      NOTE: Children less than 1 year old may have higher baseline troponin   levels and results should be interpreted in conjunction with the  overall   clinical context.     NOTE: Troponin I testing is performed using a different   testing methodology at [facility] than at other   [facility]. Direct result comparisons should only   be made within the same method.   SERIAL TROPONIN, 1 HOUR - Normal    Troponin I, High Sensitivity <3      Narrative:     Less than 99th percentile of normal range cutoff-  Female and children under 18 years old <14 ng/L; Male <21 ng/L: Negative  Repeat testing should be performed if clinically indicated.     Female and children under 18 years old 14-50 ng/L; Male 21-50 ng/L:  Consistent with possible cardiac damage and possible increased clinical   risk. Serial measurements may help to assess extent of myocardial damage.     >50 ng/L: Consistent with cardiac damage, increased clinical risk and  myocardial infarction. Serial measurements may help assess extent of   myocardial damage.      NOTE: Children less than 1 year old may have higher baseline troponin   levels and results should be interpreted in conjunction with the overall   clinical context.     NOTE: Troponin I testing is performed using a different   testing methodology at [facility] than at other   [facility]. Direct result comparisons should only   be made within the same method.   TROPONIN SERIES- (INITIAL, 1 HR)    Narrative:     The following orders were created for panel order Troponin I Series, High Sensitivity (0, 1 HR).  Procedure                               Abnormality         Status                     ---------                               -----------         ------                     Troponin I, High Sensiti...[[id number]]  Normal              Final result               Troponin, High Sensitivi...[[id number]]  Normal              Final result                 Please view results for these tests on the individual orders.   URINALYSIS WITH REFLEX CULTURE AND MICROSCOPIC    Narrative:     The following orders were  created for panel order Urinalysis with Reflex Culture and Microscopic.  Procedure                               Abnormality         Status                     ---------                               -----------         ------                     Urinalysis with Reflex C...[[id number]]                                                 Extra Urine Gray Tube[[id number]]                                                         Please view results for these tests on the individual orders.   URINALYSIS WITH REFLEX CULTURE AND MICROSCOPIC   EXTRA URINE GRAY TUBE       All other labs were within normal range or not returned as of this dictation.    Imaging  US pelvis appendix   Final Result   Nonvisualized appendix. No secondary signs of acute appendicitis.        MACRO:   None.        Signed by: Carlito Arce 7/17/2025 12:16 AM   Dictation workstation:   [id number]      US OB LESS THAN 14 WEEKS EARLY   Final Result   1. Single live intrauterine pregnancy with gestational age of 12   weeks and 4 days based on crown-rump length of 6.2 cm.   2. Routine fetal anatomic survey ultrasound is recommended between 18   and 20 weeks gestational age.        MACRO:   None.        Signed by: Carlito Arce 7/17/2025 12:13 AM   Dictation workstation:   [id number]      Point of Care Ultrasound   Final Result           Procedures  Procedures     EMERGENCY DEPARTMENT COURSE/MDM:   Medical Decision Making  30-year-old female presents emergency department chief complaint of a syncopal episode.  Medical management and treatment emergency department will consist of ruling out the patient has a possible pulmonary embolism with a D-dimer.  Will also perform a transabdominal ultrasound to rule out a ectopic versus intrauterine pregnancy.  Along with basic labs and a urine.  The patient is tachycardic here.      Patient's labs are remarkable for a mildly elevated bilirubin total.  Serum hCG is elevated we will sign the patient out to  the night team pending formal ultrasound of the abdomen and pelvics as well as D-dimer.    D-dimer is positive we will have conversation regarding possible CT PE with this patient.  At that point we will have conversation regarding possible other scans.  The patient will be signed to the night team pending evaluation.  ED Course as of 07/17/25 0057   Thu Jul 17, 2025 0037 Given the patient does not have chest pain or shortness of breath and her only symptom consistent with pulmonary embolus is the episode of syncope a pulmonary embolus is not the most likely diagnosis.  Based on her symptoms and the years criteria her D-dimer cutoff is 1000.  Based on this clinical decision making tool she is below the threshold for additional imaging to evaluate for pulmonary embolus.  Given that she is pregnant we did still offer CT angio to evaluate for PE however patient feels comfortable with monitoring her symptoms and prefers to avoid radiation if possible.  Given the D-dimer in the 700s being below the threshold for additional PE testing a CT angio at this time would be harmful than beneficial.    [FN]   0038 Patient's ultrasound did not visualize the appendix.  This was discussed with the patient.  She reports not having severe right lower quadrant pain and does not feel that she has appendicitis.  She is afebrile in the ED and no leukocytosis.  She feels comfortable with outpatient follow-up and returning to the ED if her symptoms worsen.  We did offer observation in the ED however given that her labs are reassuring she prefers to go home and return to the ED if symptoms worsen or recur [FN]      ED Course User Index  [FN] Matt Roberts MD         Diagnoses as of 07/17/25 0057   Syncope and collapse   Lower abdominal pain        Patient and or family in agreement and understanding of treatment plan.  All questions answered.      I reviewed the case with the attending ED physician. The attending ED physician agrees with  the plan. Patient and/or patient´s representative was counseled regarding labs, imaging, likely diagnosis, and plan. All questions were answered.    ED Medications administered this visit:  Medications - No data to display    New Prescriptions from this visit:    New Prescriptions    No medications on file       Follow-up:  No follow-up provider specified.      Final Impression:   1. Syncope and collapse    2. Lower abdominal pain          (Please note that portions of this note were completed with a voice recognition program.  Efforts were made to edit the dictations but occasionally words are mis-transcribed.)       Kraig Hilton MD  Resident  07/16/25 7353         [1]   Past Medical History:  Diagnosis Date    Fatty (change of) liver, not elsewhere classified 04/05/2021    Fatty liver    Other conditions influencing health status     No significant past surgical history    Other conditions influencing health status     No significant past medical history    Rectal bleeding    [2]   Past Surgical History:  Procedure Laterality Date    OTHER SURGICAL HISTORY  08/17/2020    No history of surgery   [3] No family history on file.  [4]   Social History  Socioeconomic History    Marital status:      Spouse name: Kamran   Tobacco Use    Smoking status: Never    Smokeless tobacco: Never   Vaping Use    Vaping status: Never Used   Substance and Sexual Activity    Alcohol use: Not Currently     Comment: rarely    Drug use: Never    Sexual activity: Yes     Partners: Male        Matt Roberts MD  07/17/25 0057

## 2025-07-17 NOTE — PROGRESS NOTES
Emergency Department Transition of Care Note       Signout   I received Ann Moore in signout from Dr. Hilton.  Please see the ED Provider Note for all HPI, PE and MDM up to the time of signout at 2300.  This is in addition to the primary record.    In brief Ann Moore is an 30 y.o. female presenting for syncope.    At the time of signout we were awaiting:  Labs and final results of ultrasounds    ED Course & Medical Decision Making   Medical Decision Making:  Under my care, Appendix ultrasound shows no visualized appendix and no secondary signs of acute appendicitis.  OB ultrasound shows single live intrauterine pregnancy with gestational age of 12 weeks and 4 days.  Fetal heart rate of 164 bpm.  D-dimer is elevated 757.  PE was ruled out using years criteria.  The option of obtaining a CT PE was discussed with the patient however the patient stated did not want to obtain the CT PE at this time to avoid radiation.  UA shows 2+ ketones which is consistent with dehydration.  Patient was given oral hydration in the emergency department prior to discharge and the patient was advised to drink plenty of water at home to prevent dehydration.    ED Course:  ED Course as of 07/17/25 0117   Thu Jul 17, 2025   0037 Given the patient does not have chest pain or shortness of breath and her only symptom consistent with pulmonary embolus is the episode of syncope a pulmonary embolus is not the most likely diagnosis.  Based on her symptoms and the years criteria her D-dimer cutoff is 1000.  Based on this clinical decision making tool she is below the threshold for additional imaging to evaluate for pulmonary embolus.  Given that she is pregnant we did still offer CT angio to evaluate for PE however patient feels comfortable with monitoring her symptoms and prefers to avoid radiation if possible.  Given the D-dimer in the 700s being below the threshold for additional PE testing a CT angio at this time would be harmful  than beneficial.    [FN]   0038 Patient's ultrasound did not visualize the appendix.  This was discussed with the patient.  She reports not having severe right lower quadrant pain and does not feel that she has appendicitis.  She is afebrile in the ED and no leukocytosis.  She feels comfortable with outpatient follow-up and returning to the ED if her symptoms worsen.  We did offer observation in the ED however given that her labs are reassuring she prefers to go home and return to the ED if symptoms worsen or recur [FN]      ED Course User Index  [FN] Matt Roberts MD         Diagnoses as of 07/17/25 0117   Syncope and collapse   Lower abdominal pain       Disposition   As a result of the work-up, the patient was discharged home.  she was informed of her diagnosis and instructed to come back with any concerns or worsening of condition.  she and was agreeable to the plan as discussed above.  she was given the opportunity to ask questions.  All of the patient's questions were answered.    Procedures   Procedures    Patient seen and discussed with ED attending physician.    Reji Helm PA-C  Emergency Medicine

## 2025-07-17 NOTE — DISCHARGE INSTRUCTIONS
You have been evaluated in the Emergency Department today for:  1. Syncope and collapse        2. Lower abdominal pain        When you are home please drink plenty of water because your urine sample showed that you are dehydrated.  If your symptoms return please return to the ED for another evaluation.  The appendix was not visualized on your ultrasound.  If you have chest pain or shortness of breath we may need to evaluate you again for a blood clot in the lungs.  If you feel constipated please take a stool softener.  Please try to stay hydrated       Please follow up with your primary care physician within 1 week and discuss the results of your Emergency Department evaluation with them.    Return to the Emergency Department if you experience any worsening of your symptoms, inability to eat or drink, worsening or new symptoms (difficulty breathing, chest pain, or fever). Please also return if you are not feeling better or have had difficulty following up with an outpatient doctor within one week. You may call of visit the Emergency Department at any time.    Thank you for choosing us for your care.

## 2025-07-22 ENCOUNTER — HOSPITAL ENCOUNTER (OUTPATIENT)
Dept: RADIOLOGY | Facility: HOSPITAL | Age: 30
Discharge: HOME | End: 2025-07-22
Payer: COMMERCIAL

## 2025-07-22 DIAGNOSIS — Z34.81 PRENATAL CARE, SUBSEQUENT PREGNANCY IN FIRST TRIMESTER: ICD-10-CM

## 2025-07-22 PROCEDURE — 76813 OB US NUCHAL MEAS 1 GEST: CPT

## 2025-07-22 PROCEDURE — 76813 OB US NUCHAL MEAS 1 GEST: CPT | Performed by: STUDENT IN AN ORGANIZED HEALTH CARE EDUCATION/TRAINING PROGRAM

## 2025-07-23 ENCOUNTER — APPOINTMENT (OUTPATIENT)
Dept: OBSTETRICS AND GYNECOLOGY | Facility: CLINIC | Age: 30
End: 2025-07-23
Payer: COMMERCIAL

## 2025-07-23 VITALS — WEIGHT: 176 LBS | SYSTOLIC BLOOD PRESSURE: 133 MMHG | DIASTOLIC BLOOD PRESSURE: 85 MMHG | BODY MASS INDEX: 25.99 KG/M2

## 2025-07-23 DIAGNOSIS — I10 CHRONIC HYPERTENSION: ICD-10-CM

## 2025-07-23 DIAGNOSIS — Z3A.13 13 WEEKS GESTATION OF PREGNANCY (HHS-HCC): Primary | ICD-10-CM

## 2025-07-23 DIAGNOSIS — Z34.81 PRENATAL CARE, SUBSEQUENT PREGNANCY IN FIRST TRIMESTER: ICD-10-CM

## 2025-07-23 DIAGNOSIS — O13.1: ICD-10-CM

## 2025-07-23 PROCEDURE — 86901 BLOOD TYPING SEROLOGIC RH(D): CPT

## 2025-07-23 PROCEDURE — 0501F PRENATAL FLOW SHEET: CPT | Performed by: OBSTETRICS & GYNECOLOGY

## 2025-07-23 PROCEDURE — 83020 HEMOGLOBIN ELECTROPHORESIS: CPT | Performed by: OBSTETRICS & GYNECOLOGY

## 2025-07-23 PROCEDURE — 85027 COMPLETE CBC AUTOMATED: CPT

## 2025-07-23 PROCEDURE — 86850 RBC ANTIBODY SCREEN: CPT

## 2025-07-23 PROCEDURE — 86900 BLOOD TYPING SEROLOGIC ABO: CPT

## 2025-07-23 PROCEDURE — 83021 HEMOGLOBIN CHROMOTOGRAPHY: CPT

## 2025-07-23 NOTE — PROGRESS NOTES
Routine ob at 13.2 wks.  Feeling well. Headaches have resolved.  /85, will not start a PO med at this point.  OB labs and baseline HELLP labs today.  Normal NT scan, declines genetic screening.  Taking ASA.  RTC 4 wks.

## 2025-07-24 LAB
ABO GROUP (TYPE) IN BLOOD: NORMAL
ANTIBODY SCREEN: NORMAL
ERYTHROCYTE [DISTWIDTH] IN BLOOD BY AUTOMATED COUNT: 12.2 % (ref 11.5–14.5)
HCT VFR BLD AUTO: 38.9 % (ref 36–46)
HEMOGLOBIN A2: 3.1 % (ref 2–3.5)
HEMOGLOBIN A: 96 % (ref 95.8–98)
HEMOGLOBIN F: 0.9 % (ref 0–2)
HEMOGLOBIN IDENTIFICATION INTERPRETATION: NORMAL
HGB BLD-MCNC: 12.1 G/DL (ref 12–16)
MCH RBC QN AUTO: 29 PG (ref 26–34)
MCHC RBC AUTO-ENTMCNC: 31.1 G/DL (ref 32–36)
MCV RBC AUTO: 93 FL (ref 80–100)
NRBC BLD-RTO: 0 /100 WBCS (ref 0–0)
PATH REVIEW-HGB IDENTIFICATION: NORMAL
PLATELET # BLD AUTO: 387 X10*3/UL (ref 150–450)
RBC # BLD AUTO: 4.17 X10*6/UL (ref 4–5.2)
REFLEX ADDED, ANEMIA PANEL: NORMAL
RH FACTOR (ANTIGEN D): NORMAL
WBC # BLD AUTO: 7.4 X10*3/UL (ref 4.4–11.3)

## 2025-07-25 LAB
CREAT UR-MCNC: 45 MG/DL (ref 20–275)
PROT UR-MCNC: 4 MG/DL (ref 5–24)
PROT/CREAT UR: 0.09 MG/MG CREAT (ref 0.02–0.18)
PROT/CREAT UR: 89 MG/G CREAT (ref 24–184)

## 2025-07-26 LAB
ALBUMIN SERPL-MCNC: 4.3 G/DL (ref 3.6–5.1)
ALP SERPL-CCNC: 60 U/L (ref 31–125)
ALT SERPL-CCNC: 17 U/L (ref 6–29)
ANION GAP SERPL CALCULATED.4IONS-SCNC: 9 MMOL/L (CALC) (ref 7–17)
AST SERPL-CCNC: 20 U/L (ref 10–30)
BILIRUB SERPL-MCNC: 0.8 MG/DL (ref 0.2–1.2)
BUN SERPL-MCNC: 9 MG/DL (ref 7–25)
CALCIUM SERPL-MCNC: 9.5 MG/DL (ref 8.6–10.2)
CHLORIDE SERPL-SCNC: 103 MMOL/L (ref 98–110)
CO2 SERPL-SCNC: 26 MMOL/L (ref 20–32)
CREAT SERPL-MCNC: 0.48 MG/DL (ref 0.5–0.97)
EGFRCR SERPLBLD CKD-EPI 2021: 131 ML/MIN/1.73M2
GLUCOSE SERPL-MCNC: 81 MG/DL (ref 65–99)
HBA1C MFR BLD: NORMAL %
HBV CORE AB SERPL QL IA: NORMAL
HBV SURFACE AB SERPL IA-ACNC: <5 MIU/ML
HBV SURFACE AG SERPL QL IA: NORMAL
HCV AB SERPL QL IA: NORMAL
HIV 1+2 AB+HIV1 P24 AG SERPL QL IA: NORMAL
HIV 1+2 AB+HIV1 P24 AG SERPL QL IA: NORMAL
LDH SERPL P TO L-CCNC: 146 U/L (ref 100–200)
POTASSIUM SERPL-SCNC: 3.7 MMOL/L (ref 3.5–5.3)
PROT SERPL-MCNC: 7.5 G/DL (ref 6.1–8.1)
RUBV IGG SERPL IA-ACNC: 4.28 INDEX
SODIUM SERPL-SCNC: 138 MMOL/L (ref 135–146)
T PALLIDUM AB SER QL IA: NEGATIVE
WBC # BLD AUTO: NORMAL THOUSAND/UL

## 2025-08-10 LAB
ATRIAL RATE: 113 BPM
P AXIS: 38 DEGREES
P OFFSET: 197 MS
P ONSET: 156 MS
PR INTERVAL: 134 MS
Q ONSET: 223 MS
QRS COUNT: 19 BEATS
QRS DURATION: 80 MS
QT INTERVAL: 312 MS
QTC CALCULATION(BAZETT): 427 MS
QTC FREDERICIA: 385 MS
R AXIS: 85 DEGREES
T AXIS: 25 DEGREES
T OFFSET: 379 MS
VENTRICULAR RATE: 113 BPM

## 2025-08-16 PROBLEM — Z3A.17 17 WEEKS GESTATION OF PREGNANCY (HHS-HCC): Status: ACTIVE | Noted: 2025-06-23

## 2025-08-18 ENCOUNTER — APPOINTMENT (OUTPATIENT)
Dept: OBSTETRICS AND GYNECOLOGY | Facility: CLINIC | Age: 30
End: 2025-08-18
Payer: COMMERCIAL

## 2025-08-18 VITALS — DIASTOLIC BLOOD PRESSURE: 86 MMHG | SYSTOLIC BLOOD PRESSURE: 124 MMHG | WEIGHT: 182.4 LBS | BODY MASS INDEX: 26.94 KG/M2

## 2025-08-18 DIAGNOSIS — Z3A.17 17 WEEKS GESTATION OF PREGNANCY (HHS-HCC): Primary | ICD-10-CM

## 2025-08-18 PROCEDURE — 85027 COMPLETE CBC AUTOMATED: CPT

## 2025-08-18 PROCEDURE — 0501F PRENATAL FLOW SHEET: CPT | Performed by: STUDENT IN AN ORGANIZED HEALTH CARE EDUCATION/TRAINING PROGRAM

## 2025-08-19 LAB
ERYTHROCYTE [DISTWIDTH] IN BLOOD BY AUTOMATED COUNT: 13 % (ref 11.5–14.5)
EST. AVERAGE GLUCOSE BLD GHB EST-MCNC: 100 MG/DL
EST. AVERAGE GLUCOSE BLD GHB EST-SCNC: 5.5 MMOL/L
HBA1C MFR BLD: 5.1 %
HCT VFR BLD AUTO: 36.3 % (ref 36–46)
HGB BLD-MCNC: 11.7 G/DL (ref 12–16)
MCH RBC QN AUTO: 29.5 PG (ref 26–34)
MCHC RBC AUTO-ENTMCNC: 32.2 G/DL (ref 32–36)
MCV RBC AUTO: 91 FL (ref 80–100)
NRBC BLD-RTO: 0 /100 WBCS (ref 0–0)
PLATELET # BLD AUTO: 341 X10*3/UL (ref 150–450)
RBC # BLD AUTO: 3.97 X10*6/UL (ref 4–5.2)
REFLEX ADDED, ANEMIA PANEL: NORMAL
WBC # BLD AUTO: 11.8 X10*3/UL (ref 4.4–11.3)

## 2025-09-16 ENCOUNTER — APPOINTMENT (OUTPATIENT)
Dept: OBSTETRICS AND GYNECOLOGY | Facility: CLINIC | Age: 30
End: 2025-09-16
Payer: COMMERCIAL

## 2025-09-24 ENCOUNTER — APPOINTMENT (OUTPATIENT)
Dept: OBSTETRICS AND GYNECOLOGY | Facility: CLINIC | Age: 30
End: 2025-09-24
Payer: COMMERCIAL

## 2025-10-22 ENCOUNTER — APPOINTMENT (OUTPATIENT)
Dept: OBSTETRICS AND GYNECOLOGY | Facility: CLINIC | Age: 30
End: 2025-10-22
Payer: COMMERCIAL